# Patient Record
Sex: FEMALE | Employment: UNEMPLOYED | URBAN - METROPOLITAN AREA
[De-identification: names, ages, dates, MRNs, and addresses within clinical notes are randomized per-mention and may not be internally consistent; named-entity substitution may affect disease eponyms.]

---

## 2020-11-30 ENCOUNTER — OFFICE VISIT (OUTPATIENT)
Dept: PODIATRY | Age: 30
End: 2020-11-30
Payer: MEDICARE

## 2020-11-30 VITALS — TEMPERATURE: 97 F | BODY MASS INDEX: 31.08 KG/M2 | HEIGHT: 67 IN | WEIGHT: 198 LBS | RESPIRATION RATE: 16 BRPM

## 2020-11-30 PROCEDURE — G8417 CALC BMI ABV UP PARAM F/U: HCPCS | Performed by: PODIATRIST

## 2020-11-30 PROCEDURE — G8427 DOCREV CUR MEDS BY ELIG CLIN: HCPCS | Performed by: PODIATRIST

## 2020-11-30 PROCEDURE — 4004F PT TOBACCO SCREEN RCVD TLK: CPT | Performed by: PODIATRIST

## 2020-11-30 PROCEDURE — G8484 FLU IMMUNIZE NO ADMIN: HCPCS | Performed by: PODIATRIST

## 2020-11-30 PROCEDURE — 99203 OFFICE O/P NEW LOW 30 MIN: CPT | Performed by: PODIATRIST

## 2020-11-30 PROCEDURE — 11750 EXCISION NAIL&NAIL MATRIX: CPT | Performed by: PODIATRIST

## 2020-11-30 RX ORDER — CEPHALEXIN 500 MG/1
500 CAPSULE ORAL 2 TIMES DAILY
Qty: 14 CAPSULE | Refills: 0 | Status: SHIPPED | OUTPATIENT
Start: 2020-11-30

## 2020-11-30 RX ORDER — NORGESTIMATE AND ETHINYL ESTRADIOL 0.25-0.035
1 KIT ORAL DAILY
COMMUNITY

## 2020-11-30 NOTE — PROGRESS NOTES
[facility] PHYSICIANS  [facility]  [address] [place] [address]  Dept: [phone]  Dept Fax: [phone]    NEW PATIENT PROGRESS NOTE  Date of patient's visit: 11/30/2020  Patient's Name:  Fiorella Kelly YOB: 1990            Patient Care Team:  Armando Pabon DPM as Physician (Podiatry)        Chief Complaint   Patient presents with    New Patient    Toe Pain         HPI:   Fiorella Kelly is a 31 y.o. female who presents to the office today complaining of  Nail has fallen off of   Right great toe. Symptoms began 4 month(s) ago. Patient relates pain is Present. Pain is rated 4 out of 10 and is described as intermittent. Treatments prior to today's visit include: none. Currently denies F/C/N/V. Pt's primary care physician  Dr Donte Blevins last seen 12/19/19     No Known Allergies    No past medical history on file. Prior to Admission medications    Not on File       No past surgical history on file. No family history on file. Social History     Tobacco Use    Smoking status: Current Every Day Smoker    Smokeless tobacco: Current User   Substance Use Topics    Alcohol use: Not on file       Review of Systems    Review of Systems:   History obtained from chart review and the patient  General ROS: negative for - chills, fatigue, fever, night sweats or weight gain  Constitutional: Negative for chills, diaphoresis, fatigue, fever and unexpected weight change. Musculoskeletal: Positive for arthralgias, gait problem and joint swelling. Neurological ROS: negative for - behavioral changes, confusion, headaches or seizures. Negative for weakness and numbness. Dermatological ROS: negative for - mole changes, rash  Cardiovascular: Negative for leg swelling. Gastrointestinal: Negative for constipation, diarrhea, nausea and vomiting.                Lower Extremity Physical Examination:   Vitals:   Vitals:    11/30/20 0919   Resp: 16   Temp: 97 °F (36.1 °C) General: AAO x 3 in NAD. Dermatologic Exam:  Right lateral hallux border is incurvated with increased edema and erythema. Musculoskeletal:     1st MPJ ROM decreased, Bilateral.  Muscle strength 5/5, Bilateral.  Pain present upon palpation of right hallux nail. Medial longitudinal arch, Bilateral WNL. Ankle ROM WNL,Bilateral.    Dorsally contracted digits absent digits 1-5 Bilateral.     Vascular: DP and PT pulses palpable 2/4, Bilateral.  CFT <3 seconds, Bilateral.  Hair growth present to the level of the digits, Bilateral.  Edema absent, Bilateral.  Varicosities absent, Bilateral. Erythema absent, Bilateral    Neurological: Sensation intact to light touch to level of digits, Bilateral.  Protective sensation intact 10/10 sites via 5.07/10g [place]-Artie Monofilament, Bilateral.  negative Tinel's, Bilateral.  negative Valleix sign, Bilateral.      Integument: Warm, dry, supple, Bilateral.  Open lesion absent, Bilateral.  Interdigital maceration absent to web spaces 1-4, Bilateral.    Fissures absent, Bilateral.       Asessment: Patient is a 31 y.o. female with:    Diagnosis Orders   1. Paronychia of great toe of right foot  61888 - CT REMOVAL OF NAIL BED         Plan: Patient examined and evaluated. Current condition and treatment options discussed in detail. Discussed conservative and surgical options with the patient. Verbal consent obtained for chemical matrixectomy after all questions answered. Local anesthesia obtained via Hallux block to the Right hallux utilizing 5 cc of 1% Lidocaine plain. Next the Right hallux was prepped with Betadine. A digital tourniquet was applied. A freer elevator was used to free the lateral nail border. An english anvil was used to remove the offending border in total.  A curette was used to ensure the offending border was free of any remaining nail.   Next, three 30 second applications of 20% Phenol were applied to the offending nail border followed by an isopropyl alcohol flush. Triple antibiotic ointment was applied to the nail border followed by a semi-compressive dressing. The patient was instructed to leave this dressing clean/dry/intact until the following am at which point they will begin warm epsom salt soaks followed by application of antibiotic ointment and Band-Aid BID. Patient instructed to take Tylenol PRN pain. Post-operative chemical matrixectomy instruction sheet dispensed to patient. Verbal and written instructions given to patient. Contact office with any questions/problems/concerns. RTC in 1week(s).     11/30/2020    Electronically signed by Michael Siu DPM on 11/30/2020 at 9:26 AM  11/30/2020

## 2020-12-07 ENCOUNTER — OFFICE VISIT (OUTPATIENT)
Dept: PODIATRY | Age: 30
End: 2020-12-07

## 2020-12-07 VITALS — HEIGHT: 67 IN | RESPIRATION RATE: 16 BRPM | WEIGHT: 198 LBS | BODY MASS INDEX: 31.08 KG/M2 | TEMPERATURE: 97.9 F

## 2020-12-07 PROCEDURE — 99024 POSTOP FOLLOW-UP VISIT: CPT | Performed by: PODIATRIST

## 2020-12-07 RX ORDER — DOXYCYCLINE HYCLATE 100 MG
100 TABLET ORAL 2 TIMES DAILY
Qty: 20 TABLET | Refills: 0 | Status: SHIPPED | OUTPATIENT
Start: 2020-12-07 | End: 2020-12-17

## 2020-12-07 NOTE — PROGRESS NOTES
[facility] PHYSICIANS  [facility]  [address] [address]  Dept: [phone]  Dept Fax: [phone]    POST-OP PROGRESS NOTE  Date of patient's visit: 12/7/2020  Patient's Name:  Christiano Sheikh YOB: 1990            Patient Care Team:  Jayne Hemphill DPM as Physician (Podiatry)        Chief Complaint   Patient presents with    Post-Op Check     1 wk nail avulsion       Pt's primary care physician is No primary care provider on file. last seen but has been seeing OB physician     Subjective: Christiano Sheikh is a 31 y.o. female who presents to the office today 4month(s)  S/P  Nail avulsion procedure for ingrown toenail  for correction of painful nail  Problem List Items Addressed This Visit     None      Visit Diagnoses     Paronychia of great toe of right foot    -  Primary    Post-operative state          . Patient relates pain is Absent . Pain is rated 0 out of 10 and is described as intermittent. Currently denies F/C/N/V. Physical Examination:  Minimal bleeding post operatively. Edema present. No erythema. No Pus. Operative correction is satisfactory. Assessment: Christiano Sheikh is status post as above  Normal post operative course. Doing well          ICD-10-CM    1. Paronychia of great toe of right foot  L03.031    2. Post-operative state  Z98.890          Plan:  Patient examined and evaluated. Current condition and treatment options discussed in detail. Advised pt to soak for 1 more week and monitor for infection. RX: Doxycycline 100mg. Verbal and written instructions given to patient. Orders: No orders of the defined types were placed in this encounter. Contact office with any questions/problems/concerns. RTC in 1month(s).      Electronically signed by Jayne Hemphill DPM on 12/7/2020 at 10:50 AM  12/7/2020

## 2021-03-03 ENCOUNTER — OFFICE VISIT (OUTPATIENT)
Dept: PODIATRY | Age: 31
End: 2021-03-03
Payer: MEDICARE

## 2021-03-03 VITALS — HEIGHT: 67 IN | TEMPERATURE: 97 F | WEIGHT: 198 LBS | BODY MASS INDEX: 31.08 KG/M2

## 2021-03-03 DIAGNOSIS — M79.604 PAIN IN BOTH LOWER EXTREMITIES: ICD-10-CM

## 2021-03-03 DIAGNOSIS — M79.605 PAIN IN BOTH LOWER EXTREMITIES: ICD-10-CM

## 2021-03-03 DIAGNOSIS — L60.0 INGROWN NAIL OF GREAT TOE OF LEFT FOOT: Primary | ICD-10-CM

## 2021-03-03 PROCEDURE — 4004F PT TOBACCO SCREEN RCVD TLK: CPT | Performed by: PODIATRIST

## 2021-03-03 PROCEDURE — G8427 DOCREV CUR MEDS BY ELIG CLIN: HCPCS | Performed by: PODIATRIST

## 2021-03-03 PROCEDURE — G8417 CALC BMI ABV UP PARAM F/U: HCPCS | Performed by: PODIATRIST

## 2021-03-03 PROCEDURE — 99213 OFFICE O/P EST LOW 20 MIN: CPT | Performed by: PODIATRIST

## 2021-03-03 PROCEDURE — G8484 FLU IMMUNIZE NO ADMIN: HCPCS | Performed by: PODIATRIST

## 2021-03-03 RX ORDER — PSEUDOEPHEDRINE HCL 30 MG
100 TABLET ORAL
COMMUNITY
Start: 2020-04-02

## 2021-03-03 NOTE — PROGRESS NOTES
[facility] PHYSICIANS  [facility]  [address] [address] [address] [place] [address]  Dept: [phone]  Dept Fax: [phone]    RETURN PATIENT PROGRESS NOTE  Date of patient's visit: 3/3/2021  Patient's Name:  Yamil Oliver YOB: 1990            Patient Care Team:  Brian Ho DPM as Physician (Podiatry)       Yamil Oliver 31 y.o. female that presents for follow-up of   Chief Complaint   Patient presents with    Ingrown Toenail     b/l great toenail - new issue on the left       Symptoms began several month(s) ago and are unchanged . Patient relates pain is Present. Pain is rated 1 out of 10 and is described as constant, mild with worse pain to left great toe. Treatments prior to today's visit include: previous ingrown removal on the right. Currently denies F/C/N/V. No Known Allergies    No past medical history on file. Prior to Admission medications    Medication Sig Start Date End Date Taking? Authorizing Provider   docusate (COLACE, DULCOLAX) 100 MG CAPS Take 100 mg by mouth 4/2/20  Yes Historical Provider, MD   norgestimate-ethinyl estradiol ([address] [address]) 0.25-35 MG-MCG per tablet Take 1 tablet by mouth daily   Yes Historical Provider, MD   cephALEXin (KEFLEX) 500 MG capsule Take 1 capsule by mouth 2 times daily 11/30/20  Yes Brian Ho DPM       Review of Systems    Review of Systems:  History obtained from chart review and the patient  General ROS: negative for - chills, fatigue, fever, night sweats or weight gain  Constitutional: Negative for chills, diaphoresis, fatigue, fever and unexpected weight change. Musculoskeletal: Positive for arthralgias, gait problem and joint swelling. Neurological ROS: negative for - behavioral changes, confusion, headaches or seizures. Negative for weakness and numbness. Dermatological ROS: negative for - mole changes, rash  Cardiovascular: Negative for leg swelling.    Gastrointestinal: Negative for constipation, diarrhea, nausea and vomiting. Lower Extremity Physical Examination:     Vitals:   Vitals:    03/03/21 1259   Temp: 97 °F (36.1 °C)     General: AAO x 3 in NAD. Dermatologic Exam:  Left hallux nail is incurvated with edema, minimal erythema. Musculoskeletal:     1st MPJ ROM decreased, Bilateral.  Muscle strength 5/5, Bilateral.  Pain present upon palpation of left hallux nails. Medial longitudinal arch, Bilateral WNL. Ankle ROM WNL,Bilateral.    Dorsally contracted digits absent digits 1-5 Bilateral.     Vascular: DP and PT pulses palpable 2/4, Bilateral.  CFT <3 seconds, Bilateral.  Hair growth present to the level of the digits, Bilateral.  Edema absent, Bilateral.  Varicosities absent, Bilateral. Erythema absent, Bilateral    Neurological: Sensation intact to light touch to level of digits, Bilateral.  Protective sensation intact 10/10 sites via 5.07/10g [place]-Artie Monofilament, Bilateral.  negative Tinel's, Bilateral.  negative Valleix sign, Bilateral.      Integument: Warm, dry, supple, Bilateral.  Open lesion absent, Bilateral.  Interdigital maceration absent to web spaces 1-4, Bilateral.    Fissures absent, Bilateral.     Asessment: Patient is a 31 y.o. female with:    Diagnosis Orders   1. Ingrown nail of great toe of left foot     2. Pain in both lower extremities           Plan: Patient examined and evaluated. Current condition and treatment options discussed in detail. Slant back procedure perform on nail border using nail nipper to patients tolerance. Advised pt to consider nail avulsion at next visit if symptoms persist or worsen. Pt to monitor for increased signs of infection such as erythema, edema and drainage. Advised pt to monitor for infection. Verbal and written instructions given to patient. Contact office with any questions/problems/concerns. No orders of the defined types were placed in this encounter.     No orders of the defined types were placed in this encounter. RTC in 2month(s).     3/3/2021      Electronically signed by Brian Ho DPM on 3/3/2021 at 1:06 PM  3/3/2021

## 2021-03-03 NOTE — PATIENT INSTRUCTIONS
Schedule a Vaccine  When you qualify to receive the vaccine, call the [facility]) COVID-19 Vaccination Hotline to schedule your appointment or to get additional information about the [facility]) locations which are offering the COVID-19 vaccine. To be 94% effective, it's important that you receive two doses of one of the COVID-19 vaccines. -If you are receiving the [place] vaccine, your second shot will be scheduled as close to 21 days after the first shot as possible. -If you are receiving the Moderna vaccine, your second shot will be scheduled as close to 28 days after the first shot as possible. [facility]) COVID-19 Vaccination Hotline: [phone]    Links to [facility]) website and [facility] website:    [employer]/[facility]-monitoring-coronavirus-covid-19/covid-19-vaccine/[email or web address]    [email or web address]